# Patient Record
Sex: MALE | Race: WHITE | Employment: UNEMPLOYED | URBAN - METROPOLITAN AREA
[De-identification: names, ages, dates, MRNs, and addresses within clinical notes are randomized per-mention and may not be internally consistent; named-entity substitution may affect disease eponyms.]

---

## 2021-09-22 ENCOUNTER — OFFICE VISIT (OUTPATIENT)
Dept: FAMILY MEDICINE CLINIC | Facility: CLINIC | Age: 51
End: 2021-09-22
Payer: MEDICARE

## 2021-09-22 VITALS
SYSTOLIC BLOOD PRESSURE: 120 MMHG | TEMPERATURE: 98 F | HEIGHT: 71 IN | RESPIRATION RATE: 20 BRPM | DIASTOLIC BLOOD PRESSURE: 82 MMHG | WEIGHT: 183 LBS | OXYGEN SATURATION: 97 % | HEART RATE: 89 BPM | BODY MASS INDEX: 25.62 KG/M2

## 2021-09-22 DIAGNOSIS — Z20.822 EXPOSURE TO COVID-19 VIRUS: Primary | ICD-10-CM

## 2021-09-22 PROCEDURE — 99203 OFFICE O/P NEW LOW 30 MIN: CPT | Performed by: NURSE PRACTITIONER

## 2021-09-22 NOTE — PATIENT INSTRUCTIONS
Coronavirus Disease 2019 (COVID-19)     [facility] is committed to the safety and well-being of our patients, members, employees, and communities.  As concerns arise about the new strain of coronavirus that causes COVID-19, [facility] exposure  • After day 7 from date of last exposure with a negative test result (test must occur on day 5 or later)  After stopping quarantine, you should  • Watch for symptoms until 14 days after exposure.   • If you have symptoms, immediately self-isolate Care     If you are awaiting test results or are confirmed positive for COVID -19, and your symptoms worsen at home with symptoms such as: extreme weakness, difficult breathing, or unrelenting fevers greater than 100.4 degrees Fahrenheit, you should contac Follow-up  If you are diagnosed with COVID, refrain from exercise until approved by your primary care provider. Please call your primary care provider within 2 days of your discharge to arrange for a telehealth follow-up.  CDC does not recommend repeat test Control & Prevention (CDC)  10 things you can do to manage your health at home, Nelda.nl. pdf  [employer]ly.[employer].au Retrieved March 17, 2021, from https://health.[facility]/coronavirus/covid-19-information/covid-19-long-haulers. html  Long-term effects of covid-19. (n.d.).  Retrieved May 11, 2021, from [employer]

## 2021-09-22 NOTE — PROGRESS NOTES
CHIEF COMPLAINT:   Patient presents with:  Covid-19 Test      HPI:   Breanne Martel is a 46year old male who presents for Covid 19 testing. Pt is accompanied by his girlfriend. Pt reports COVID he and his girlfriend had exposure 5/19.    At this time, not PROC FOR SACROILIAC JOINT ARTHROGRAPHY &/OR ANESTHETIC/STEROID  10/20/2010    Performed by Vicky Fuller at [facility]   • OTHER SURGICAL HISTORY  9-21-10    flow/us/cysto-Dr. Patric nOtiveros   • OTHER SURGICAL HISTORY  10-26-10    UDS         Soci entirety of this informational document. It includes information related to exposure, pending tests, positive results, aftercare, and plasma donation.       Quarantine (for anyone in close contact with someone who has COVID-19)  Anyone who has been in [employer] others, wash your hands, avoid crowds, and take other steps to prevent the spread of COVID-19.   CDC continues to endorse quarantine for 14 days and recognizes that any quarantine shorter than 14 days balances reduced burden against a small possibility of s difficult time are changing frequently. Your healthcare provider can help direct you on next steps.     If you have not been exposed or are not aware of an exposure to COVID-19 and are concerned about your symptoms, please contact your health care provider Elva Perrin, is looking for patients who have recovered from COVID-19 and would be interested in donating plasma. Convalescent plasma is a component of blood that, in people who have recovered from COVID-19, contains antibodies against the virus.  The Nelda.nl. pdf  [employer]stagg.[employer].au  http://www.[facility].[place].gov/topics-services/diseases-and-conditions/dise https://health.[facility]/coronavirus/covid-19-information/covid-19-long-haulers. html  Long-term effects of covid-19. (n.d.).  Retrieved May 11, 2021, from [employer].  What it means to be A Coronavirus

## 2021-09-23 LAB — SARS-COV-2 RNA RESP QL NAA+PROBE: NOT DETECTED

## 2025-03-07 RX ORDER — DIAZEPAM 5 MG/1
5 TABLET ORAL EVERY 6 HOURS PRN
COMMUNITY

## 2025-03-07 RX ORDER — OMEPRAZOLE 40 MG/1
40 CAPSULE, DELAYED RELEASE ORAL DAILY
COMMUNITY

## 2025-03-07 RX ORDER — LOSARTAN POTASSIUM 25 MG/1
25 TABLET ORAL DAILY
COMMUNITY

## 2025-03-07 RX ORDER — ALFUZOSIN HYDROCHLORIDE 10 MG/1
10 TABLET, EXTENDED RELEASE ORAL DAILY
COMMUNITY

## 2025-03-07 RX ORDER — POTASSIUM CHLORIDE 1500 MG/1
20 TABLET, EXTENDED RELEASE ORAL 2 TIMES DAILY
COMMUNITY

## 2025-03-07 RX ORDER — FENOFIBRATE 134 MG/1
134 CAPSULE ORAL
COMMUNITY

## 2025-03-07 RX ORDER — FUROSEMIDE 40 MG/1
40 TABLET ORAL 2 TIMES DAILY
COMMUNITY

## 2025-03-07 RX ORDER — DOCUSATE SODIUM 100 MG/1
100 CAPSULE, LIQUID FILLED ORAL DAILY
COMMUNITY

## 2025-03-07 RX ORDER — METOPROLOL SUCCINATE 25 MG/1
25 TABLET, EXTENDED RELEASE ORAL DAILY
COMMUNITY

## 2025-03-18 ENCOUNTER — ANESTHESIA EVENT (OUTPATIENT)
Dept: SURGERY | Facility: HOSPITAL | Age: 55
End: 2025-03-18
Payer: MEDICARE

## 2025-03-19 ENCOUNTER — HOSPITAL ENCOUNTER (INPATIENT)
Facility: HOSPITAL | Age: 55
LOS: 2 days | Discharge: HOME | DRG: 330 | End: 2025-03-21
Attending: SURGERY | Admitting: SURGERY
Payer: MEDICARE

## 2025-03-19 ENCOUNTER — HOSPITAL ENCOUNTER (INPATIENT)
Facility: HOSPITAL | Age: 55
LOS: 2 days | Discharge: HOME | End: 2025-03-21
Attending: SURGERY | Admitting: SURGERY
Payer: MEDICARE

## 2025-03-19 ENCOUNTER — ANESTHESIA (OUTPATIENT)
Dept: SURGERY | Facility: HOSPITAL | Age: 55
End: 2025-03-19
Payer: MEDICARE

## 2025-03-19 LAB — GLUCOSE BLD-MCNC: 115 MG/DL (ref 70–99)

## 2025-03-19 PROCEDURE — 8E0W4CZ ROBOTIC ASSISTED PROCEDURE OF TRUNK REGION, PERCUTANEOUS ENDOSCOPIC APPROACH: ICD-10-PCS | Performed by: SURGERY

## 2025-03-19 PROCEDURE — 94760 N-INVAS EAR/PLS OXIMETRY 1: CPT

## 2025-03-19 PROCEDURE — 94640 AIRWAY INHALATION TREATMENT: CPT

## 2025-03-19 PROCEDURE — 88307 TISSUE EXAM BY PATHOLOGIST: CPT | Performed by: SURGERY

## 2025-03-19 PROCEDURE — 82962 GLUCOSE BLOOD TEST: CPT

## 2025-03-19 PROCEDURE — 0DBN4ZZ EXCISION OF SIGMOID COLON, PERCUTANEOUS ENDOSCOPIC APPROACH: ICD-10-PCS | Performed by: SURGERY

## 2025-03-19 RX ORDER — SODIUM CHLORIDE, SODIUM LACTATE, POTASSIUM CHLORIDE, CALCIUM CHLORIDE 600; 310; 30; 20 MG/100ML; MG/100ML; MG/100ML; MG/100ML
INJECTION, SOLUTION INTRAVENOUS CONTINUOUS
Status: DISCONTINUED | OUTPATIENT
Start: 2025-03-19 | End: 2025-03-19 | Stop reason: HOSPADM

## 2025-03-19 RX ORDER — BUPIVACAINE HYDROCHLORIDE 5 MG/ML
INJECTION, SOLUTION EPIDURAL; INTRACAUDAL; PERINEURAL AS NEEDED
Status: DISCONTINUED | OUTPATIENT
Start: 2025-03-19 | End: 2025-03-19 | Stop reason: HOSPADM

## 2025-03-19 RX ORDER — LIDOCAINE HYDROCHLORIDE 10 MG/ML
INJECTION, SOLUTION EPIDURAL; INFILTRATION; INTRACAUDAL; PERINEURAL AS NEEDED
Status: DISCONTINUED | OUTPATIENT
Start: 2025-03-19 | End: 2025-03-19 | Stop reason: SURG

## 2025-03-19 RX ORDER — SENNOSIDES 8.6 MG
17.2 TABLET ORAL NIGHTLY PRN
Status: DISCONTINUED | OUTPATIENT
Start: 2025-03-19 | End: 2025-03-21

## 2025-03-19 RX ORDER — DEXAMETHASONE SODIUM PHOSPHATE 4 MG/ML
VIAL (ML) INJECTION AS NEEDED
Status: DISCONTINUED | OUTPATIENT
Start: 2025-03-19 | End: 2025-03-19 | Stop reason: SURG

## 2025-03-19 RX ORDER — PANTOPRAZOLE SODIUM 40 MG/1
40 TABLET, DELAYED RELEASE ORAL
Status: DISCONTINUED | OUTPATIENT
Start: 2025-03-20 | End: 2025-03-21

## 2025-03-19 RX ORDER — PHENYLEPHRINE HCL 10 MG/ML
VIAL (ML) INJECTION AS NEEDED
Status: DISCONTINUED | OUTPATIENT
Start: 2025-03-19 | End: 2025-03-19 | Stop reason: SURG

## 2025-03-19 RX ORDER — ENOXAPARIN SODIUM 100 MG/ML
40 INJECTION SUBCUTANEOUS DAILY
Status: DISCONTINUED | OUTPATIENT
Start: 2025-03-20 | End: 2025-03-21

## 2025-03-19 RX ORDER — DIAZEPAM 5 MG/1
5 TABLET ORAL EVERY 6 HOURS PRN
Status: DISCONTINUED | OUTPATIENT
Start: 2025-03-19 | End: 2025-03-21

## 2025-03-19 RX ORDER — FAMOTIDINE 20 MG/1
20 TABLET, FILM COATED ORAL 2 TIMES DAILY
Status: DISCONTINUED | OUTPATIENT
Start: 2025-03-19 | End: 2025-03-21

## 2025-03-19 RX ORDER — SODIUM CHLORIDE 9 MG/ML
INJECTION, SOLUTION INTRAVENOUS CONTINUOUS
Status: DISCONTINUED | OUTPATIENT
Start: 2025-03-19 | End: 2025-03-20

## 2025-03-19 RX ORDER — OXYCODONE HYDROCHLORIDE 5 MG/1
5 TABLET ORAL EVERY 4 HOURS PRN
Status: DISCONTINUED | OUTPATIENT
Start: 2025-03-19 | End: 2025-03-21

## 2025-03-19 RX ORDER — KETOROLAC TROMETHAMINE 30 MG/ML
INJECTION, SOLUTION INTRAMUSCULAR; INTRAVENOUS AS NEEDED
Status: DISCONTINUED | OUTPATIENT
Start: 2025-03-19 | End: 2025-03-19 | Stop reason: SURG

## 2025-03-19 RX ORDER — ONDANSETRON 2 MG/ML
4 INJECTION INTRAMUSCULAR; INTRAVENOUS EVERY 6 HOURS PRN
Status: DISCONTINUED | OUTPATIENT
Start: 2025-03-19 | End: 2025-03-21

## 2025-03-19 RX ORDER — FUROSEMIDE 40 MG/1
40 TABLET ORAL 2 TIMES DAILY
Status: DISCONTINUED | OUTPATIENT
Start: 2025-03-20 | End: 2025-03-21

## 2025-03-19 RX ORDER — SODIUM CHLORIDE, SODIUM LACTATE, POTASSIUM CHLORIDE, CALCIUM CHLORIDE 600; 310; 30; 20 MG/100ML; MG/100ML; MG/100ML; MG/100ML
1 INJECTION, SOLUTION INTRAVENOUS CONTINUOUS
Status: DISCONTINUED | OUTPATIENT
Start: 2025-03-19 | End: 2025-03-20

## 2025-03-19 RX ORDER — METOPROLOL SUCCINATE 25 MG/1
25 TABLET, EXTENDED RELEASE ORAL DAILY
Status: DISCONTINUED | OUTPATIENT
Start: 2025-03-20 | End: 2025-03-21

## 2025-03-19 RX ORDER — HYDROMORPHONE HYDROCHLORIDE 1 MG/ML
0.6 INJECTION, SOLUTION INTRAMUSCULAR; INTRAVENOUS; SUBCUTANEOUS EVERY 5 MIN PRN
Status: DISCONTINUED | OUTPATIENT
Start: 2025-03-19 | End: 2025-03-19 | Stop reason: HOSPADM

## 2025-03-19 RX ORDER — ACETAMINOPHEN 500 MG
1000 TABLET ORAL ONCE
Status: DISCONTINUED | OUTPATIENT
Start: 2025-03-19 | End: 2025-03-19 | Stop reason: HOSPADM

## 2025-03-19 RX ORDER — ALBUTEROL SULFATE 90 UG/1
2 INHALANT RESPIRATORY (INHALATION) EVERY 6 HOURS PRN
Status: DISCONTINUED | OUTPATIENT
Start: 2025-03-19 | End: 2025-03-21

## 2025-03-19 RX ORDER — ROCURONIUM BROMIDE 10 MG/ML
INJECTION, SOLUTION INTRAVENOUS AS NEEDED
Status: DISCONTINUED | OUTPATIENT
Start: 2025-03-19 | End: 2025-03-19 | Stop reason: SURG

## 2025-03-19 RX ORDER — OXYCODONE HYDROCHLORIDE 10 MG/1
10 TABLET ORAL EVERY 4 HOURS PRN
Status: DISCONTINUED | OUTPATIENT
Start: 2025-03-19 | End: 2025-03-21

## 2025-03-19 RX ORDER — HYDROMORPHONE HYDROCHLORIDE 1 MG/ML
0.4 INJECTION, SOLUTION INTRAMUSCULAR; INTRAVENOUS; SUBCUTANEOUS EVERY 5 MIN PRN
Status: DISCONTINUED | OUTPATIENT
Start: 2025-03-19 | End: 2025-03-19 | Stop reason: HOSPADM

## 2025-03-19 RX ORDER — METRONIDAZOLE 500 MG/100ML
500 INJECTION, SOLUTION INTRAVENOUS ONCE
Status: COMPLETED | OUTPATIENT
Start: 2025-03-19 | End: 2025-03-19

## 2025-03-19 RX ORDER — NALOXONE HYDROCHLORIDE 0.4 MG/ML
0.08 INJECTION, SOLUTION INTRAMUSCULAR; INTRAVENOUS; SUBCUTANEOUS AS NEEDED
Status: DISCONTINUED | OUTPATIENT
Start: 2025-03-19 | End: 2025-03-19 | Stop reason: HOSPADM

## 2025-03-19 RX ORDER — FAMOTIDINE 10 MG/ML
20 INJECTION, SOLUTION INTRAVENOUS 2 TIMES DAILY
Status: DISCONTINUED | OUTPATIENT
Start: 2025-03-19 | End: 2025-03-21

## 2025-03-19 RX ORDER — MAGNESIUM OXIDE 400 MG/1
400 TABLET ORAL DAILY
Status: DISCONTINUED | OUTPATIENT
Start: 2025-03-19 | End: 2025-03-21

## 2025-03-19 RX ORDER — SODIUM CHLORIDE, SODIUM LACTATE, POTASSIUM CHLORIDE, CALCIUM CHLORIDE 600; 310; 30; 20 MG/100ML; MG/100ML; MG/100ML; MG/100ML
INJECTION, SOLUTION INTRAVENOUS CONTINUOUS PRN
Status: DISCONTINUED | OUTPATIENT
Start: 2025-03-19 | End: 2025-03-19 | Stop reason: SURG

## 2025-03-19 RX ORDER — ACETAMINOPHEN 500 MG
1000 TABLET ORAL ONCE AS NEEDED
Status: DISCONTINUED | OUTPATIENT
Start: 2025-03-19 | End: 2025-03-19 | Stop reason: HOSPADM

## 2025-03-19 RX ORDER — DIPHENHYDRAMINE HYDROCHLORIDE 50 MG/ML
INJECTION, SOLUTION INTRAMUSCULAR; INTRAVENOUS AS NEEDED
Status: DISCONTINUED | OUTPATIENT
Start: 2025-03-19 | End: 2025-03-19 | Stop reason: SURG

## 2025-03-19 RX ORDER — ONDANSETRON 2 MG/ML
INJECTION INTRAMUSCULAR; INTRAVENOUS AS NEEDED
Status: DISCONTINUED | OUTPATIENT
Start: 2025-03-19 | End: 2025-03-19 | Stop reason: SURG

## 2025-03-19 RX ORDER — FLUTICASONE PROPIONATE AND SALMETEROL 250; 50 UG/1; UG/1
1 POWDER RESPIRATORY (INHALATION) 2 TIMES DAILY
Status: DISCONTINUED | OUTPATIENT
Start: 2025-03-19 | End: 2025-03-21

## 2025-03-19 RX ORDER — TAMSULOSIN HYDROCHLORIDE 0.4 MG/1
0.4 CAPSULE ORAL
Status: DISCONTINUED | OUTPATIENT
Start: 2025-03-20 | End: 2025-03-21

## 2025-03-19 RX ORDER — ONDANSETRON 2 MG/ML
4 INJECTION INTRAMUSCULAR; INTRAVENOUS EVERY 6 HOURS PRN
Status: DISCONTINUED | OUTPATIENT
Start: 2025-03-19 | End: 2025-03-19 | Stop reason: HOSPADM

## 2025-03-19 RX ORDER — LIDOCAINE HYDROCHLORIDE 10 MG/ML
INJECTION, SOLUTION INFILTRATION; PERINEURAL AS NEEDED
Status: DISCONTINUED | OUTPATIENT
Start: 2025-03-19 | End: 2025-03-19 | Stop reason: HOSPADM

## 2025-03-19 RX ORDER — INDOCYANINE GREEN AND WATER 25 MG
KIT INJECTION AS NEEDED
Status: DISCONTINUED | OUTPATIENT
Start: 2025-03-19 | End: 2025-03-19 | Stop reason: SURG

## 2025-03-19 RX ORDER — HYDROCODONE BITARTRATE AND ACETAMINOPHEN 5; 325 MG/1; MG/1
2 TABLET ORAL ONCE AS NEEDED
Status: DISCONTINUED | OUTPATIENT
Start: 2025-03-19 | End: 2025-03-19 | Stop reason: HOSPADM

## 2025-03-19 RX ORDER — PROCHLORPERAZINE EDISYLATE 5 MG/ML
5 INJECTION INTRAMUSCULAR; INTRAVENOUS EVERY 8 HOURS PRN
Status: DISCONTINUED | OUTPATIENT
Start: 2025-03-19 | End: 2025-03-19 | Stop reason: HOSPADM

## 2025-03-19 RX ORDER — ESMOLOL HYDROCHLORIDE 10 MG/ML
INJECTION INTRAVENOUS AS NEEDED
Status: DISCONTINUED | OUTPATIENT
Start: 2025-03-19 | End: 2025-03-19 | Stop reason: SURG

## 2025-03-19 RX ORDER — HEPARIN SODIUM 5000 [USP'U]/ML
5000 INJECTION, SOLUTION INTRAVENOUS; SUBCUTANEOUS ONCE
Status: COMPLETED | OUTPATIENT
Start: 2025-03-19 | End: 2025-03-19

## 2025-03-19 RX ORDER — KETOROLAC TROMETHAMINE 30 MG/ML
30 INJECTION, SOLUTION INTRAMUSCULAR; INTRAVENOUS EVERY 6 HOURS
Status: DISCONTINUED | OUTPATIENT
Start: 2025-03-19 | End: 2025-03-21

## 2025-03-19 RX ORDER — LABETALOL HYDROCHLORIDE 5 MG/ML
5 INJECTION, SOLUTION INTRAVENOUS EVERY 5 MIN PRN
Status: DISCONTINUED | OUTPATIENT
Start: 2025-03-19 | End: 2025-03-19 | Stop reason: HOSPADM

## 2025-03-19 RX ORDER — HYDROMORPHONE HYDROCHLORIDE 1 MG/ML
0.2 INJECTION, SOLUTION INTRAMUSCULAR; INTRAVENOUS; SUBCUTANEOUS EVERY 5 MIN PRN
Status: DISCONTINUED | OUTPATIENT
Start: 2025-03-19 | End: 2025-03-19 | Stop reason: HOSPADM

## 2025-03-19 RX ORDER — FENOFIBRATE 134 MG/1
134 CAPSULE ORAL
Status: DISCONTINUED | OUTPATIENT
Start: 2025-03-20 | End: 2025-03-21

## 2025-03-19 RX ORDER — HYDROMORPHONE HYDROCHLORIDE 1 MG/ML
INJECTION, SOLUTION INTRAMUSCULAR; INTRAVENOUS; SUBCUTANEOUS
Status: COMPLETED
Start: 2025-03-19 | End: 2025-03-19

## 2025-03-19 RX ORDER — HYDROCODONE BITARTRATE AND ACETAMINOPHEN 5; 325 MG/1; MG/1
1 TABLET ORAL ONCE AS NEEDED
Status: DISCONTINUED | OUTPATIENT
Start: 2025-03-19 | End: 2025-03-19 | Stop reason: HOSPADM

## 2025-03-19 RX ORDER — PROCHLORPERAZINE EDISYLATE 5 MG/ML
5 INJECTION INTRAMUSCULAR; INTRAVENOUS EVERY 8 HOURS PRN
Status: DISCONTINUED | OUTPATIENT
Start: 2025-03-19 | End: 2025-03-21

## 2025-03-19 RX ORDER — LOSARTAN POTASSIUM 25 MG/1
25 TABLET ORAL DAILY
Status: DISCONTINUED | OUTPATIENT
Start: 2025-03-20 | End: 2025-03-21

## 2025-03-19 RX ORDER — MEPERIDINE HYDROCHLORIDE 25 MG/ML
12.5 INJECTION INTRAMUSCULAR; INTRAVENOUS; SUBCUTANEOUS AS NEEDED
Status: DISCONTINUED | OUTPATIENT
Start: 2025-03-19 | End: 2025-03-19 | Stop reason: HOSPADM

## 2025-03-19 RX ORDER — HYDROMORPHONE HYDROCHLORIDE 1 MG/ML
0.4 INJECTION, SOLUTION INTRAMUSCULAR; INTRAVENOUS; SUBCUTANEOUS EVERY 2 HOUR PRN
Status: DISCONTINUED | OUTPATIENT
Start: 2025-03-19 | End: 2025-03-21

## 2025-03-19 RX ORDER — MAGNESIUM SULFATE HEPTAHYDRATE 500 MG/ML
INJECTION, SOLUTION INTRAMUSCULAR; INTRAVENOUS AS NEEDED
Status: DISCONTINUED | OUTPATIENT
Start: 2025-03-19 | End: 2025-03-19 | Stop reason: SURG

## 2025-03-19 RX ORDER — POLYETHYLENE GLYCOL 3350 17 G/17G
17 POWDER, FOR SOLUTION ORAL DAILY PRN
Status: DISCONTINUED | OUTPATIENT
Start: 2025-03-19 | End: 2025-03-21

## 2025-03-19 RX ORDER — HYDROMORPHONE HYDROCHLORIDE 1 MG/ML
0.8 INJECTION, SOLUTION INTRAMUSCULAR; INTRAVENOUS; SUBCUTANEOUS EVERY 2 HOUR PRN
Status: DISCONTINUED | OUTPATIENT
Start: 2025-03-19 | End: 2025-03-21

## 2025-03-19 RX ADMIN — SODIUM CHLORIDE: 9 INJECTION, SOLUTION INTRAVENOUS at 10:04:00

## 2025-03-19 RX ADMIN — MAGNESIUM SULFATE HEPTAHYDRATE 2 G: 500 INJECTION, SOLUTION INTRAMUSCULAR; INTRAVENOUS at 08:08:00

## 2025-03-19 RX ADMIN — ROCURONIUM BROMIDE 100 MG: 10 INJECTION, SOLUTION INTRAVENOUS at 07:38:00

## 2025-03-19 RX ADMIN — SODIUM CHLORIDE: 9 INJECTION, SOLUTION INTRAVENOUS at 08:48:00

## 2025-03-19 RX ADMIN — SODIUM CHLORIDE, SODIUM LACTATE, POTASSIUM CHLORIDE, CALCIUM CHLORIDE: 600; 310; 30; 20 INJECTION, SOLUTION INTRAVENOUS at 10:04:00

## 2025-03-19 RX ADMIN — ONDANSETRON 4 MG: 2 INJECTION INTRAMUSCULAR; INTRAVENOUS at 09:41:00

## 2025-03-19 RX ADMIN — DEXAMETHASONE SODIUM PHOSPHATE 4 MG: 4 MG/ML VIAL (ML) INJECTION at 08:06:00

## 2025-03-19 RX ADMIN — ESMOLOL HYDROCHLORIDE 70 MG: 10 INJECTION INTRAVENOUS at 07:36:00

## 2025-03-19 RX ADMIN — SODIUM CHLORIDE, SODIUM LACTATE, POTASSIUM CHLORIDE, CALCIUM CHLORIDE: 600; 310; 30; 20 INJECTION, SOLUTION INTRAVENOUS at 07:46:00

## 2025-03-19 RX ADMIN — METRONIDAZOLE 500 MG: 500 INJECTION, SOLUTION INTRAVENOUS at 07:50:00

## 2025-03-19 RX ADMIN — DIPHENHYDRAMINE HYDROCHLORIDE 12.5 MG: 50 INJECTION, SOLUTION INTRAMUSCULAR; INTRAVENOUS at 08:07:00

## 2025-03-19 RX ADMIN — PHENYLEPHRINE HCL 100 MCG: 10 MG/ML VIAL (ML) INJECTION at 07:48:00

## 2025-03-19 RX ADMIN — LIDOCAINE HYDROCHLORIDE 25 MG: 10 INJECTION, SOLUTION EPIDURAL; INFILTRATION; INTRACAUDAL; PERINEURAL at 07:38:00

## 2025-03-19 RX ADMIN — SODIUM CHLORIDE: 9 INJECTION, SOLUTION INTRAVENOUS at 07:32:00

## 2025-03-19 RX ADMIN — INDOCYANINE GREEN AND WATER 10 MG: 25 MG KIT INJECTION at 09:02:00

## 2025-03-19 RX ADMIN — KETOROLAC TROMETHAMINE 30 MG: 30 INJECTION, SOLUTION INTRAMUSCULAR; INTRAVENOUS at 09:41:00

## 2025-03-19 NOTE — ANESTHESIA PROCEDURE NOTES
Airway  Date/Time: 3/19/2025 7:42 AM  Urgency: elective      General Information and Staff    Patient location during procedure: OR  Anesthesiologist: Carlitos Whipple MD  Performed: anesthesiologist   Performed by: Carlitos Whipple MD  Authorized by: Carlitos Whipple MD      Indications and Patient Condition  Indications for airway management: anesthesia  Spontaneous Ventilation: absent  Sedation level: deep  Preoxygenated: yes  Patient position: sniffing  Mask difficulty assessment: 0 - not attempted    Final Airway Details  Final airway type: endotracheal airway      Successful airway: ETT  Cuffed: yes   Successful intubation technique: direct laryngoscopy  Facilitating devices/methods: intubating stylet  Endotracheal tube insertion site: oral  Blade: Sheryl  Blade size: #4  ETT size (mm): 8.0    Cormack-Lehane Classification: grade IIA - partial view of glottis  Placement verified by: capnometry   Measured from: gums  ETT to gums (cm): 23  Number of attempts at approach: 1    Additional Comments  Dentition unchanged after DL and intubation, atraumatic procedure.

## 2025-03-19 NOTE — PLAN OF CARE
NURSING ADMISSION NOTE      Patient admitted via Cart  Oriented to room.  Safety precautions initiated.  Bed in low position.  Call light in reach.    1240: Pt arrived to floor in stable condition. 2 person skin check performed with Charito PCT, no skin breakdown noted.

## 2025-03-19 NOTE — H&P
H&P Notes  - documented in this encounter   Simon Horne MD - 02/06/2025 9:45 AM CST  Formatting of this note is different from the original.  Dorian Gonzalez is a 54 year old male. Patient presents with:  New Patient: Diverticulitis      Aung Cox    HPI:  Patient presents with recalcitrant acute diverticulitis of the sigmoid colon. Send several episodes in the past. He has been struggling for the last 2 months on and off with abdominal pain despite both outpatient and inpatient treatment of acute diverticulitis of the sigmoid colon. His last workup was in early January with a CT scan. At that time he was found to have acute diverticulitis of the sigmoid colon. He completed his antibiotic course and currently has minimal pain. Past medical history significant for a complicated spine surgery through an anterior approach with an iliac artery injury with subsequent disability, leg weakness and numbness. He is on crutches. He is currently on home oxygen. He denies fevers or chills. He is tolerating a diet.    Past Medical History:  Diagnosis Date  Adrenal nodule (HCC) 07/26/2024  adrenal adenoma noted in CT ABDOMEN+PELVIS on 12/18/2024.  BACK PAIN  Cancer (HCC) 2021  Congestive heart disease (HCC)  COPD (chronic obstructive pulmonary disease) ([facility])  NO O2  COVID 2022  Deep vein thrombosis (HCC) 2022  Exposure to medical diagnostic radiation  Foot drop, left foot  High blood pressure  History of prostate cancer  Insomnia  Neuropathy  Osteoarthritis  Personal history of antineoplastic chemotherapy  Pulmonary embolism (HCC) 2022    Past Surgical History:  Procedure Laterality Date  APPENDECTOMY  age 9  BACK SURGERY 2009  L4-L5 S1 fused and caged  CARDIAC CATH - CARDIO (EXTERNAL) 03/11/2024  No significant coronary artery disease.Mild calcific right and left coronary system.Normal pulmonary artery pressure.Nomal Left Ventricular Funstion. Medical Management. Dr MEHNAZ Nolan [facility]  CATH PERCUTANEOUS TRANSLUMINAL  CORONARY ANGIOPLASTY  COLSC FLX W/RMVL OF TUMOR POLYP LESION SNARE TQ 03/31/2010  Performed by VANNESA ALBERT at [facility]  FLUOR NEEDLE/CATH SPINE/PARASPINAL DX/THER ADDON 09/15/2010  Performed by MALENA MAZA at [facility]  FLUOR NEEDLE/CATH SPINE/PARASPINAL DX/THER ADDON 10/20/2010  Performed by MALENA MAZA at [facility]  INJECT SI JOINT ARTHRGRPHY&/ANES/STEROID W/SABINA 09/15/2010  Performed by MALENA MAZA at [facility]  INJECT SI JOINT ARTHRGRPHY&/ANES/STEROID W/SABINA 09/15/2010  Performed by MALENA MAZA at [facility]  INJECT SI JOINT ARTHRGRPHY&/ANES/STEROID W/SABINA 10/20/2010  Performed by MALENA MAZA at [facility]  INJECT SI JOINT ARTHRGRPHY&/ANES/STEROID W/SABINA 10/20/2010  Performed by MALENA MAZA at [facility]  OTHER SURGICAL HISTORY 09/21/2010  flow/us/cysto-Dr. Amin  OTHER SURGICAL HISTORY 10/26/2010  UDS  OTHER SURGICAL HISTORY 12/19/2022  left leg SI joint infusion 3 titanium plates  UNLISTED MUSCULOSKELETAL PROCEDURE HEAD 04/10/2009  Performed by AMY MANZO at [facility]    Family History  Problem Relation Age of Onset  Heart Disorder Father  Cancer Father  prostate  Diabetes Father  Hypertension Father  Other (Other) Father  CVA  Diabetes Mother  Cancer Maternal Grandfather  Diabetes Paternal Grandmother  Other (lupus anticoagulant) Sister  Hypertension Sister  Diabetes Sister  Cancer Sister  cervical  Other (lupus anticoagulant) Sister  Diabetes Brother  Cancer Brother  prostate  Cancer Paternal Aunt  bone  Other (anticardiolipin) Paternal Cousin Female  Other (lupus anticoagulant) Paternal Cousin Female  Other (anticardiolipin) Paternal Cousin Female  Other (lupus anticoagulant) Paternal Cousin Female  Other (anticardiolipin) Paternal Cousin Female  Other (lupus anticoagulant) Paternal Cousin Female    Social History  Tobacco Use  Smoking status: Every  Day  Packs/day: 0.50  Years: 0.5 packs/day for 46.9 years (23.5 ttl pk-yrs)  Types: Cigarettes  Start date: 11/1/1977  Last attempt to quit: 11/1/2022  Passive exposure: Current  Smokeless tobacco: Current  Vaping Use  Vaping status: Never Used  Alcohol use: Not Currently  Comment: twice a year  Drug use: Yes  Types: Cannabis  Comment: edibles, once daily, lasst use 5/15/24    Allergies:    Aspirin HIVES, SWELLING  Comment:Other reaction(s): Facial Swelling Other  reaction(s): facial swelling Other reaction(s):  Facial Swelling Other reaction(s): facial swelling  Other reaction(s): facial swelling  Pregabalin SWELLING  Current Meds:  Current Outpatient Medications  Medication Sig Dispense Refill  predniSONE 10 MG Oral Tab Take 1 capsule by mouth daily.  azithromycin 250 MG Oral Tab Take 1 capsule by mouth daily.  alfuzosin ER 10 MG Oral Tablet 24 Hr Take 1 capsule by mouth daily.  lidocaine 5 % External Ointment Apply 5 g topically 4 (four) times daily.  metoprolol succinate ER 25 MG Oral Tablet 24 Hr TAKE ONE TABLET BY MOUTH ONE TIME DAILY 90 tablet 1  ondansetron 4 MG Oral Tablet Dispersible Take 4 mg by mouth every 6 (six) hours as needed.  losartan 25 MG Oral Tab Take 1 tablet (25 mg total) by mouth daily. 30 tablet 2  metRONIDAZOLE 500 MG Oral Tab Take 1 tablet (500 mg total) by mouth 3 (three) times daily. 21 tablet 0  meclizine 12.5 MG Oral Tab Take 1 tablet (12.5 mg total) by mouth 3 (three) times daily as needed for Nausea. 30 tablet 0  fenofibrate micronized 134 MG Oral Cap TAKE ONE CAPSULE BY MOUTH ONE TIME DAILY with breakfast 90 capsule 0  Omeprazole 40 MG Oral Capsule Delayed Release Take 1 capsule (40 mg total) by mouth before breakfast. 90 capsule 3  furosemide (LASIX) 20 MG Oral Tab Take 1 tablet (20 mg total) by mouth daily. 90 tablet 3  albuterol 108 (90 Base) MCG/ACT Inhalation Aero Soln Inhale 2 puffs into the lungs every 6 (six) hours as needed for Wheezing. 1 each  3  Fluticasone-Umeclidin-Vilant (TRELEGY ELLIPTA) 100-62.5-25 MCG/ACT Inhalation Aerosol Powder, Breath Activated Inhale 1 puff into the lungs daily. 3 each 3  albuterol 108 (90 Base) MCG/ACT Inhalation Aero Soln Inhale 2 puffs into the lungs every 6 (six) hours as needed for Wheezing. 1 each 1  alfuzosin ER 10 MG Oral Tablet 24 Hr Take 10 mg by mouth daily.  diazePAM 5 MG Oral Tab  HYDROcodone-acetaminophen  MG Oral Tab Take 1 tablet by mouth every 6 (six) hours as needed for Pain.    ROS:  A comprehensive 14 point review of systems was completed. Pertinent positives and negatives noted in the the HPI.    EXAM:    GENERAL: well developed, well nourished, in no apparent distress  SKIN: no rashes, no suspicious lesions  EYES: PERRLA, EOMI, sclera anicteric, conjunctiva normal; fundi normal, there is no nystagmus  HEENT: normocephalic; normal nose, pharynx and TM's  NECK: supple; FROM; no JVD, no TMG, no carotid bruits  BREASTS: deferred  RESPIRATORY: clear to percussion and auscultation  CARDIOVASCULAR: S1, S2 normal, RRR; no S3, no S4; no click; murmur negative  ABDOMEN: normal active BS+, soft, nondistended; no HSM; no masses; no bruits; nontender  RECTAL: deferred  LYMPHATIC: no lymphadenopathy  MUSCULOSKELETAL: no acute synovitis upper or lower extremity, no spinal tenderness  EXTREMITIES: no cyanosis, clubbing or edema, peripheral pulses intact  NEUROLOGIC: intact; no sensorimotor deficit; reflexes normal  PSYCHIATRIC: alert and oriented x 3; affect appropriate    IMPRESSION  Recurrent acute diverticulitis of the sigmoid colon.    PLAN:  We discussed the role of surgery in this condition. I have recommended a follow-up CT scan now that his treatment has been completed but his symptoms have not completely resolved. He may need additional antibiotics. We discussed the role of the robotic assisted low anterior colon resection. The rationale risk benefits and alternatives of this approach was explained in  detail. All of his questions were answered. Will await CT scan findings prior to scheduling. They voiced understanding    Thank you very much for your kind referrals    Simon Horne MD    Electronically signed by Simon Horne MD at 02/06/2025 11:03 AM CST    The above referenced H&P was reviewed by Simon Horne MD on 3/19/2025, the patient was examined and no significant changes have occurred in the patient's condition since the H&P was performed.  Risks and benefits were discussed, proceed with procedure as planned.

## 2025-03-19 NOTE — ANESTHESIA PROCEDURE NOTES
Peripheral IV  Date/Time: 3/19/2025 7:46 AM  Inserted by: Carlitos Whipple MD    Placement  Needle size: 16 G  Laterality: left  Location: forearm  Site prep: alcohol  Technique: ultrasound guided  Attempts: 1

## 2025-03-19 NOTE — ANESTHESIA PREPROCEDURE EVALUATION
PRE-OP EVALUATION    Patient Name: Dorian Gonzalez    Admit Diagnosis: ACUTE DIVERTICULITIS    Pre-op Diagnosis: ACUTE DIVERTICULITIS    XI ROBOT-ASSISTED LOW ANTERIOR COLON RESECTION POSSIBLE OPEN, RIGID PROCTOSIGMOIDOSCOPY    Anesthesia Procedure: XI ROBOT-ASSISTED LOW ANTERIOR COLON RESECTION POSSIBLE OPEN, RIGID PROCTOSIGMOIDOSCOPY    Surgeons and Role:     * Simon Horne MD - Primary    Pre-op vitals reviewed.  Temp: 97.8 °F (36.6 °C)  Pulse: 70  Resp: 16  BP: 106/70  SpO2: 98 %  Body mass index is 30.4 kg/m².    Current medications reviewed.  Hospital Medications:   acetaminophen (Tylenol Extra Strength) tab 1,000 mg  1,000 mg Oral Once    sodium chloride 0.9% infusion   Intravenous Continuous    [COMPLETED] heparin (Porcine) 5000 UNIT/ML injection 5,000 Units  5,000 Units Subcutaneous Once    cefTRIAXone (Rocephin) 2 g in sodium chloride 0.9% 100 mL IVPB-ADDV  2 g Intravenous Once    metroNIDAZOLE in sodium chloride 0.79% (Flagyl) 5 mg/mL IVPB premix 500 mg  500 mg Intravenous Once       Outpatient Medications:   Prescriptions Prior to Admission[1]    Allergies: Aspirin and Lyrica [pregabalin]      Anesthesia Evaluation  Patient Active Problem List   Diagnosis    Other and unspecified disc disorder of unspecified region    Degeneration of lumbar or lumbosacral intervertebral disc    Lumbar radicular pain        Past Medical History:    BACK PAIN    Back problem    Cancer (HCC)    PROSTATE    Congestive heart disease (HCC)    COPD (chronic obstructive pulmonary disease) (HCC)    on oxygen    Deep vein thrombosis (HCC)    Diverticulosis of large intestine    Exposure to medical diagnostic radiation    LAST DOSE OVER 4 YEARS AGO    High blood pressure    High cholesterol    History of prostate cancer    Neuropathy    LEFT INVERTED FOOT DROP,  AND TO BOTH LEGS OUTER    Osteoarthritis    Pulmonary embolism (HCC)    Pulmonary emphysema (HCC)        Past Surgical History:   Procedure Laterality Date     Appendectomy      age 9    Colonoscopy,remv lesn,snare  03/31/2010    Performed by VANNESA ALBERT at [facility]    Fluor gid & loclzj ndl/cath spi dx/ther njx  09/15/2010    Performed by MALENA MAZA at [facility]    Fluor gid & loclzj ndl/cath spi dx/ther njx  10/20/2010    Performed by MALENA MAZA at [facility]    Head surgery proc unlisted  04/10/2009    Performed by AMY MANZO at [facility]    Injection proc for sacroiliac joint arthrography &/or anesthetic/steroid  09/15/2010    Performed by MALENA MAZA at [facility]    Injection proc for sacroiliac joint arthrography &/or anesthetic/steroid  09/15/2010    Performed by MALENA MAZA at [facility]    Injection proc for sacroiliac joint arthrography &/or anesthetic/steroid  10/20/2010    Performed by MALENA MAZA at [facility]    Injection proc for sacroiliac joint arthrography &/or anesthetic/steroid  10/20/2010    Performed by MALENA MAZA at [facility]    Other surgical history  09/21/2010    flow/us/cysto-Dr. Amin    Other surgical history  10/26/2010    UDS    Spine surgery procedure unlisted       Social History     Socioeconomic History    Marital status:    Tobacco Use    Smoking status: Every Day     Types: Cigarettes    Smokeless tobacco: Never   Vaping Use    Vaping status: Never Used   Substance and Sexual Activity    Alcohol use: Yes     Comment: RARELY   2X YEAR    Drug use: Yes     Types: Cannabis     Comment: MEDICAL CARD     History   Drug Use    Types: Cannabis     Comment: MEDICAL CARD     Available pre-op labs reviewed.               Airway      Mallampati: II  Mouth opening: >3 FB  TM distance: 4 - 6 cm  Neck ROM: full Cardiovascular      Rhythm: regular  Rate: normal     Dental    Dentition appears grossly intact         Pulmonary    Pulmonary exam normal.  Breath sounds clear to  auscultation bilaterally.               Other findings              ASA: 3   Plan: general  NPO status verified and     Post-procedure pain management plan discussed with surgeon and patient.    Comment:  I explained intrinsic risks of general anesthesia, including nausea, dental damage, sore throat, mouth injury,and hoarseness from airway management.  All questions were answered and understanding was demonstrated of risks.  Informed permission was obtained to proceed as documented in the signed consent form.      Plan/risks discussed with: patient  Use of blood product(s) discussed with: patient    Consented to blood products.          Present on Admission:  **None**             [1]   Medications Prior to Admission   Medication Sig Dispense Refill Last Dose/Taking    furosemide 40 MG Oral Tab Take 1 tablet (40 mg total) by mouth 2 (two) times daily.   3/19/2025 Morning    potassium chloride 20 MEQ Oral Tab CR Take 1 tablet (20 mEq total) by mouth 2 (two) times daily.   3/19/2025 Morning    alfuzosin ER 10 MG Oral Tablet 24 Hr Take 1 tablet (10 mg total) by mouth daily.   3/19/2025 Morning    docusate sodium 100 MG Oral Cap Take 1 capsule (100 mg total) by mouth daily.   3/18/2025    fenofibrate micronized 134 MG Oral Cap Take 1 capsule (134 mg total) by mouth daily with breakfast.   3/19/2025 Morning    losartan 25 MG Oral Tab Take by mouth daily.   3/18/2025    metoprolol succinate ER 25 MG Oral Tablet 24 Hr Take 1 tablet (25 mg total) by mouth daily.   3/19/2025 at  3:30 AM    Omeprazole 40 MG Oral Capsule Delayed Release Take 1 capsule (40 mg total) by mouth daily.   3/19/2025 Morning    diazePAM 5 MG Oral Tab Take 1 tablet (5 mg total) by mouth every 6 (six) hours as needed for Anxiety.   3/17/2025    MAGNESIUM OR Take by mouth. WITH VITAMIN D AND ZINC   3/18/2025    Fluticasone-Umeclidin-Vilant (TRELEGY ELLIPTA IN) Inhale into the lungs.   3/19/2025 Morning    ALBUTEROL SULFATE IN Inhale into the lungs.   Past  Month    NORCO  MG OR TABS 1 TABLET EVERY 4 HOURS AS NEEDED   3/17/2025

## 2025-03-19 NOTE — ANESTHESIA POSTPROCEDURE EVALUATION
[facility]    Dorian Gonzalez Patient Status:  Inpatient   Age/Gender 54 year old male MRN [id number]   Location [facility] SURGERY Attending Simon Horne MD   Hosp Day # 0 PCP Simón Carrington MD       Anesthesia Post-op Note    XI ROBOT-ASSISTED LOW ANTERIOR COLON RESECTION RIGID PROCTOSIGMOIDOSCOPY    Procedure Summary       Date: 03/19/25 Room / Location:  MAIN OR 09 / [facility] MAIN OR    Anesthesia Start: 0732 Anesthesia Stop:     Procedure: XI ROBOT-ASSISTED LOW ANTERIOR COLON RESECTION RIGID PROCTOSIGMOIDOSCOPY Diagnosis: (ACUTE DIVERTICULITIS)    Surgeons: Simon Horne MD Anesthesiologist: Carlitos Whipple MD    Anesthesia Type: general ASA Status: 3            Anesthesia Type: general    Vitals Value Taken Time   /87 03/19/25 1005   Temp 97.8 03/19/25 1005   Pulse 87 03/19/25 1005   Resp 16 03/19/25 1005   SpO2 98 03/19/25 1005           Patient Location: PACU    Anesthesia Type: general    Airway Patency: patent    Postop Pain Control: adequate    Mental Status: preanesthetic baseline    Nausea/Vomiting: none    Cardiopulmonary/Hydration status: stable euvolemic    Complications: no apparent anesthesia related complications    Postop vital signs: stable    Dental Exam: Unchanged from Preop    Patient to be discharged from PACU when criteria met.

## 2025-03-19 NOTE — OPERATIVE REPORT
[facility]                                                         Operative Note    Dorian Gonzalez Location: ASC Perioperative   CSN [id number] MRN [id number]   Admission Date 3/19/2025 Procedure Date 3/19/2025   Attending Physician Simon Horne MD Procedure Physician Simon Horne MD     Pre-Operative Diagnosis: ACUTE DIVERTICULITIS     Post-Operative Diagnosis: ACUTE DIVERTICULITIS    Procedure Performed: XI ROBOT-ASSISTED LOW ANTERIOR COLON RESECTION RIGID PROCTOSIGMOIDOSCOPY    Surgeon: Simon Horne MD     Assistant(s):  Surgical Assistant.: Nani Barker RSA    The surgical Assistant was necessary for this procedure.  The assistant was involved in patient positioning, instrument exchange, improving exposure optimizing visualization of patient's safety, and closure.  The duties of the assistant allowed for reduced risk of the performance of this procedure and care of this patient         Anesthesia: General       Complications: none       Estimated Blood Loss: Blood Output: 20 mL (3/19/2025  9:43 AM)              Operative Summary: Patient was taken to the operating room placed in a lithotomy position.  They were prepped and draped in usual fashion after being placed under general anesthesia.  An incision was made to the right of midline at the level of the umbilicus and dissection was carried down to the fascia.  Fascia was incised exposing the rectus abdominis musculature.  A muscle-splitting technique was utilized to expose the peritoneum.  Peritoneum was opened and the abdomen was entered.  An Joe retractor was placed in the abdomen was insufflated with 15 mm of carbon dioxide after the yellow cath was placed.  12 mm trocar was placed in the right lower quadrant followed by 2 additional 8 mm trochars in the upper abdomen in the line of a diagonal.  A 5 mm assistant port was placed in the right mid quadrant.  The additional trochars were placed under direct laparoscopic view.  Patient was  then placed in a steep Trendelenburg position.  Small bowel was retrieved out of the pelvis.  The excised da Keaton robot was brought into position and docked.  Instrumentation was placed.  Patient was found to have an inflammatory reaction involving the sigmoid colon consistent with their preoperative history of diverticulitis.  Proximal to the area of disease was mobilized laterally to and around the splenic flexure.  The medial dissection was then encountered by incising the mesentery to the distal colon exposing the inferior mesenteric artery vascular bundle.  Windows were created on either side of this and then this was ligated with the vessel sealer.  Windows were created followed by ligation of the vasculature to this abnormal segment of colon to the rectosigmoid junction.  This allowed exposure of the retroperitoneum keeping the ureter and pelvic vascular out of harm's way.  Once this was accomplished a lateral to medial dissection occurred mobilizing the colon away from the left lateral sidewall down to the distal region of the sigmoid colon and to the rectosigmoid junction.  Colon was divided at the junction with the 45 mm sure form.  Dissection was then carried medial to the area of disease to an area of colon which appeared to be healthy.  Immunofluorescence was utilized to appreciate the demarcation of the bowel viability in this area.  Once this was accomplished a 29 mm anvil was introduced to the abdominal cavity.  An enterotomy was made on the specimen side of the demarcated bowel as well as a small enterotomy on the proximal end.  A 29 mm anvil was then advanced through this enterotomy and exited out of the proximal colon.  Specimen enterotomy was closed with a 3 oh VueLock suture followed by dividing the colon distal to the exit site of the anvil.  Specimen was set aside for future removal.  Colon was then brought down to the pelvis.  There is no tension present.  The rectal remnant was then  serially dilated followed by advancing the EEA stapling device into the rectal remnant and exiting the spike out the staple line.  An end-to-end anastomosis was then performed in this location.  The donuts were inspected and appeared to be intact.  A rigid proctoscopy was then performed insufflating air up into the rectal remnant.  Saline was infused in the pelvis and no gross leaks or air bubbles were appreciated.  Once the rigid proctoscope was removed the anterior staple line was reinforced with interrupted 3-0 silk sutures.  Hemostasis achieved.  Specimen was retrieved from the abdominal cavity through the Joe port.  This was followed by closing the 12 mm trocar site in the right lower quadrant with an 0 Vicryl suture at the fascial level.  Trochars were removed.  Larger wound near the umbilicus was reapproximated in 2 layers by first reapproximating the peritoneum with an 0 Vicryl suture followed by closure of the fascia with an 0 PDS.  Remainder the wounds were closed with Monocryl.  Dermabond was placed on the skin.  Patient was awoken taken recovery room in satisfactory condition.  There are no complications during the case          Simon Horne MD  3/19/2025  9:47 AM

## 2025-03-20 LAB
ANION GAP SERPL CALC-SCNC: 6 MMOL/L (ref 0–18)
BASOPHILS # BLD AUTO: 0.03 X10(3) UL (ref 0–0.2)
BASOPHILS NFR BLD AUTO: 0.2 %
BUN BLD-MCNC: 12 MG/DL (ref 9–23)
CALCIUM BLD-MCNC: 8.7 MG/DL (ref 8.7–10.6)
CHLORIDE SERPL-SCNC: 109 MMOL/L (ref 98–112)
CO2 SERPL-SCNC: 27 MMOL/L (ref 21–32)
CREAT BLD-MCNC: 1.17 MG/DL
EGFRCR SERPLBLD CKD-EPI 2021: 73 ML/MIN/1.73M2 (ref 60–?)
EOSINOPHIL # BLD AUTO: 0.03 X10(3) UL (ref 0–0.7)
EOSINOPHIL NFR BLD AUTO: 0.2 %
ERYTHROCYTE [DISTWIDTH] IN BLOOD BY AUTOMATED COUNT: 13.1 %
GLUCOSE BLD-MCNC: 104 MG/DL (ref 70–99)
HCT VFR BLD AUTO: 33.7 %
HGB BLD-MCNC: 12 G/DL
IMM GRANULOCYTES # BLD AUTO: 0.05 X10(3) UL (ref 0–1)
IMM GRANULOCYTES NFR BLD: 0.4 %
LYMPHOCYTES # BLD AUTO: 2.73 X10(3) UL (ref 1–4)
LYMPHOCYTES NFR BLD AUTO: 21.8 %
MAGNESIUM SERPL-MCNC: 1.6 MG/DL (ref 1.6–2.6)
MCH RBC QN AUTO: 29.7 PG (ref 26–34)
MCHC RBC AUTO-ENTMCNC: 35.6 G/DL (ref 31–37)
MCV RBC AUTO: 83.4 FL
MONOCYTES # BLD AUTO: 0.88 X10(3) UL (ref 0.1–1)
MONOCYTES NFR BLD AUTO: 7 %
NEUTROPHILS # BLD AUTO: 8.81 X10 (3) UL (ref 1.5–7.7)
NEUTROPHILS # BLD AUTO: 8.81 X10(3) UL (ref 1.5–7.7)
NEUTROPHILS NFR BLD AUTO: 70.4 %
OSMOLALITY SERPL CALC.SUM OF ELEC: 294 MOSM/KG (ref 275–295)
PHOSPHATE SERPL-MCNC: 1.9 MG/DL (ref 2.4–5.1)
PLATELET # BLD AUTO: 304 10(3)UL (ref 150–450)
POTASSIUM SERPL-SCNC: 3.4 MMOL/L (ref 3.5–5.1)
POTASSIUM SERPL-SCNC: 4 MMOL/L (ref 3.5–5.1)
RBC # BLD AUTO: 4.04 X10(6)UL
SODIUM SERPL-SCNC: 142 MMOL/L (ref 136–145)
WBC # BLD AUTO: 12.5 X10(3) UL (ref 4–11)

## 2025-03-20 PROCEDURE — 80048 BASIC METABOLIC PNL TOTAL CA: CPT | Performed by: SURGERY

## 2025-03-20 PROCEDURE — 84100 ASSAY OF PHOSPHORUS: CPT | Performed by: SURGERY

## 2025-03-20 PROCEDURE — 84132 ASSAY OF SERUM POTASSIUM: CPT | Performed by: STUDENT IN AN ORGANIZED HEALTH CARE EDUCATION/TRAINING PROGRAM

## 2025-03-20 PROCEDURE — 94760 N-INVAS EAR/PLS OXIMETRY 1: CPT

## 2025-03-20 PROCEDURE — 83735 ASSAY OF MAGNESIUM: CPT | Performed by: SURGERY

## 2025-03-20 PROCEDURE — 85025 COMPLETE CBC W/AUTO DIFF WBC: CPT | Performed by: SURGERY

## 2025-03-20 RX ORDER — MAGNESIUM OXIDE 400 MG/1
400 TABLET ORAL ONCE
Status: COMPLETED | OUTPATIENT
Start: 2025-03-20 | End: 2025-03-20

## 2025-03-20 RX ORDER — POTASSIUM CHLORIDE 14.9 MG/ML
20 INJECTION INTRAVENOUS ONCE
Status: COMPLETED | OUTPATIENT
Start: 2025-03-20 | End: 2025-03-20

## 2025-03-20 NOTE — PROGRESS NOTES
[facility] Hospitalist Progress Note     CC: Hospital Follow up    PCP: Simón Carrington MD       Assessment/Plan:     Active Problems:    * No active hospital problems. *          54 year old male with past medical history of prostate cancer, COPD on 2 L O2 nasal cannula chronically, hypertension, hyperlipidemia, DVT/PE, HFrEF (45-50% LVEF February 2024), spinal surgery with anterior approach c/b iliac artery injury with subsequent disability/leg weakness, who is presenting to the hospital for robotic assisted low anterior colon resection rigid proctosigmoidoscopy.     Recurrent sigmoid diverticulitis s/p robotic assisted low anterior colon resection rigid proctosigmoidoscopy 3/19)  - PO/IV meds for pain control per surgery, wean to oral meds as able  - Adv diet as tolerated per surgery, zofran prn for nausea, OBR  - PT/OT/SW  - cbc reviwed, stable, appropriate blood loss  - further management per surgery     COPD  -On baseline 2 L O2 nasal cannula  -Continue home Advair, Incruse Ellipta inhalers     HFrEF (45-50% LVEF February 2024)  -Continue home metoprolol succinate, resume losartan  - Hold Lasix while patient on IVF, recommend reducing IVF given HFrEF    GERD  -Continue pantoprazole, Pepcid     Hypertension  -Continue beta-blocker, resume losartan tomorrow     History of DVT/PE  -No longer on anticoagulation  -Recommend low-dose Eliquis or daily Lovenox during postop period At discharge     Dispo: Inpatient     Outpatient or previous hospital records reviewed. Questions/concerns were discussed with patient and/or family by bedside. Discussed with surgery.     Frandy Roman DO  Hospitalist  [facility]       Subjective:     Complains of right shoulder pain.  Overall was doing better until later in the morning when he started to have abdominal cramping.  Has been ambulatory.  Denies any chest pain or shortness of breath.    OBJECTIVE:    Blood pressure 122/76, pulse 79, temperature 98.3 °F (36.8 °C),  temperature source Oral, resp. rate 17, height 5' 11\" (1.803 m), weight 218 lb (98.9 kg), SpO2 98%.    Temp:  [97.5 °F (36.4 °C)-98.3 °F (36.8 °C)] 98.3 °F (36.8 °C)  Pulse:  [74-91] 79  Resp:  [16-20] 17  BP: (115-133)/(71-85) 122/76  SpO2:  [95 %-98 %] 98 %      Intake/Output:    Intake/Output Summary (Last 24 hours) at 3/20/2025 1414  Last data filed at 3/20/2025 1316  Gross per 24 hour   Intake 2130 ml   Output 1475 ml   Net 655 ml       Last 3 Weights   03/19/25 0636 218 lb (98.9 kg)   03/07/25 1102 220 lb (99.8 kg)   09/22/21 1318 183 lb (83 kg)   02/01/11 0849 214 lb (97.1 kg)       Exam   Gen: No acute distress, alert and oriented x3, no focal neurologic deficits  Heent: NC AT, mucous memb clear, neck supple  Pulm: Lungs clear, normal respiratory effort  CV: Heart with regular rate and rhythm, no peripheral edema  Abd: Abdomen soft, tenderness around incision sites, incisions appear clean dry and intact, nondistended, hypoactive bowel sounds  MSK: Full range of motion in extremities, no clubbing, no cyanosis  Skin: no rashes or lesions  Neuro: AO*3, motor intact, no sensory deficits  Psyc: appropriate mood and affect      Data Review:       Labs:     Recent Labs   Lab 03/20/25  0511   RBC 4.04*   HGB 12.0*   HCT 33.7*   MCV 83.4   MCH 29.7   MCHC 35.6   RDW 13.1   NEPRELIM 8.81*   WBC 12.5*   .0         Recent Labs   Lab 03/20/25  0511   *   BUN 12   CREATSERUM 1.17   EGFRCR 73   CA 8.7      K 3.4*      CO2 27.0       No results for input(s): \"ALT\", \"AST\", \"ALB\", \"AMYLASE\", \"LIPASE\", \"LDH\" in the last 168 hours.    Invalid input(s): \"ALPHOS\", \"TBIL\", \"DBIL\", \"TPROT\"      Imaging:  No results found.      Meds:      potassium chloride  20 mEq Intravenous Once    famotidine  20 mg Oral BID    Or    famotidine  20 mg Intravenous BID    magnesium oxide  400 mg Oral Daily    enoxaparin  40 mg Subcutaneous Daily    ketorolac  30 mg Intravenous Q6H    tamsulosin  0.4 mg Oral Daily @ 0700     fenofibrate micronized  134 mg Oral Daily with breakfast    fluticasone-salmeterol  1 puff Inhalation BID    umeclidinium bromide  1 puff Inhalation Daily    furosemide  40 mg Oral BID    losartan  25 mg Oral Daily    metoprolol succinate ER  25 mg Oral Daily    pantoprazole  40 mg Oral QAM AC      sodium chloride 20 mL/hr at 03/19/25 0637    lactated ringers 1 mL/kg/hr (03/19/25 1331)       polyethylene glycol (PEG 3350)    sennosides    oxyCODONE **OR** oxyCODONE    HYDROmorphone **OR** HYDROmorphone    melatonin    ondansetron    prochlorperazine    albuterol    diazePAM      Supplementary Documentation:   DVT Mechanical Prophylaxis:   SCDs, Early ambuation  DVT Pharmacologic Prophylaxis   Medication    enoxaparin (Lovenox) 40 MG/0.4ML SUBQ injection 40 mg                Code Status: Full Code  Garcia: No urinary catheter in place  Garcia Duration (in days):   Central line:    BRADY: 3/21/2025

## 2025-03-20 NOTE — CONSULTS
General Medicine Consult      Reason for consult:    Consulted by: Simon Horne MD    PCP: Simón Carrington MD      History of Present Illness: Patient is a 54 year old male with past medical history of prostate cancer, COPD on 2 L O2 nasal cannula chronically, hypertension, hyperlipidemia, DVT/PE, HFrEF (45-50% LVEF February 2024), spinal surgery with anterior approach c/b iliac artery injury with subsequent disability/leg weakness, who is presenting to the hospital for robotic assisted low anterior colon resection rigid proctosigmoidoscopy.  Patient has had recurrent episodes of sigmoid diverticulitis in the past for which patient was receiving this procedure.  Tolerated surgery well on 3/19.    PMH:  Past Medical History:    BACK PAIN    Back problem    Cancer (HCC)    PROSTATE    Congestive heart disease (HCC)    COPD (chronic obstructive pulmonary disease) (HCC)    on oxygen    Deep vein thrombosis (HCC)    Diverticulosis of large intestine    Exposure to medical diagnostic radiation    LAST DOSE OVER 4 YEARS AGO    High blood pressure    High cholesterol    History of prostate cancer    Neuropathy    LEFT INVERTED FOOT DROP,  AND TO BOTH LEGS OUTER    Osteoarthritis    Pulmonary embolism (HCC)    Pulmonary emphysema (HCC)        PSH:  Past Surgical History:   Procedure Laterality Date    Appendectomy      age 9    Colonoscopy,remankush duron,snare  03/31/2010    Performed by VANNESA ALBERT at [facility], [facility]    Fluor gid & loclzj ndl/cath spi dx/ther njx  09/15/2010    Performed by MALENA MAZA at [facility], [facility]    Fluor gid & loclzj ndl/cath spi dx/ther njx  10/20/2010    Performed by MALENA MAZA at [facility], [facility]    Head surgery proc unlisted  04/10/2009    Performed by AMY MANZO at [facility], [facility]    Injection proc for sacroiliac joint arthrography &/or anesthetic/steroid  09/15/2010    Performed by MALENA MAZA at [facility], [facility]    Injection  proc for sacroiliac joint arthrography &/or anesthetic/steroid  09/15/2010    Performed by MALENA MAZA at [facility] [place], [facility]    Injection proc for sacroiliac joint arthrography &/or anesthetic/steroid  10/20/2010    Performed by MALENA MAZA at [facility], [facility]    Injection proc for sacroiliac joint arthrography &/or anesthetic/steroid  10/20/2010    Performed by MALENA MAZA at [facility], [facility]    Other surgical history  09/21/2010    flow/us/cysto-Dr. Amin    Other surgical history  10/26/2010    UDS    Spine surgery procedure unlisted          Home Medications:  Medications Taking[1]    Scheduled Medication:   famotidine  20 mg Oral BID    Or    famotidine  20 mg Intravenous BID    magnesium oxide  400 mg Oral Daily    [START ON 3/20/2025] enoxaparin  40 mg Subcutaneous Daily    ketorolac  30 mg Intravenous Q6H    [START ON 3/20/2025] tamsulosin  0.4 mg Oral Daily @ 0700    [START ON 3/20/2025] fenofibrate micronized  134 mg Oral Daily with breakfast    fluticasone-salmeterol  1 puff Inhalation BID    [START ON 3/20/2025] umeclidinium bromide  1 puff Inhalation Daily    [START ON 3/20/2025] furosemide  40 mg Oral BID    [START ON 3/20/2025] losartan  25 mg Oral Daily    metoprolol succinate ER  25 mg Oral Daily    [START ON 3/20/2025] pantoprazole  40 mg Oral QAM AC     Continuous Infusing Medication:   sodium chloride 20 mL/hr at 03/19/25 0637    lactated ringers 1 mL/kg/hr (03/19/25 1331)     PRN Medication:  polyethylene glycol (PEG 3350)    sennosides    oxyCODONE **OR** oxyCODONE    HYDROmorphone **OR** HYDROmorphone    melatonin    ondansetron    prochlorperazine    Albuterol Sulfate    diazePAM     ALL:  Allergies[2]     Soc Hx:  Social History     Tobacco Use    Smoking status: Every Day     Types: Cigarettes    Smokeless tobacco: Never   Substance Use Topics    Alcohol use: Yes     Comment: RARELY   2X YEAR        Fam Hx  Family History   Problem Relation Age of  Onset    Cancer Father         prostate    Diabetes Father     Hypertension Father     Other (Other) Father         CVA    Diabetes Mother     Cancer Brother         prostate       Review of Systems  Comprehensive ROS reviewed and negative except for what's stated above.  Including negative for chest pain, shortness of breath, syncope.       OBJECTIVE:  /85 (BP Location: Right arm)   Pulse 91   Temp 98 °F (36.7 °C) (Oral)   Resp 16   Ht 5' 11\" (1.803 m)   Wt 218 lb (98.9 kg)   SpO2 95%   BMI 30.40 kg/m²     Exam   Gen: Alert, no acute distress  Heent: Normocephalic, atraumatic, neck supple, EOMI, PERRLA  Pulm: Lungs CTAB, normal respiratory effort  CV:  Regular rate and rhythm, no murmurs/rubs/gallops  Abd: Soft, nontender, nondistended, bowel sounds present.  Surgical sites intact.  Extremities: No peripheral edema, no clubbing, pulses intact   Skin: No rashes or lesions  Neuro: AOx3, no focal neurologic deficits, CN II-XII grossly intact  Psych: appropriate mood and affect     Diagnostics:   CBC/Chem  No results for input(s): \"WBC\", \"HGB\", \"MCV\", \"PLT\", \"BAND\", \"INR\" in the last 168 hours.    Invalid input(s): \"LYM#\", \"MONO#\", \"BASOS#\", \"EOSIN#\"    No results for input(s): \"NA\", \"K\", \"CL\", \"CO2\", \"BUN\", \"CREATSERUM\", \"GLU\", \"CA\", \"CAION\", \"MG\", \"PHOS\" in the last 168 hours.    No results for input(s): \"ALT\", \"AST\", \"ALB\", \"AMYLASE\", \"LIPASE\", \"LDH\" in the last 168 hours.    Invalid input(s): \"ALPHOS\", \"TBIL\", \"DBIL\", \"TPROT\"    Radiology:   All imaging personally reviewed      No results found.       ASSESSMENT / PLAN:    54 year old male with past medical history of prostate cancer, COPD on 2 L O2 nasal cannula chronically, hypertension, hyperlipidemia, DVT/PE, HFrEF (45-50% LVEF February 2024), spinal surgery with anterior approach c/b iliac artery injury with subsequent disability/leg weakness, who is presenting to the hospital for robotic assisted low anterior colon resection rigid  proctosigmoidoscopy.    Recurrent sigmoid diverticulitis s/p robotic assisted low anterior colon resection rigid proctosigmoidoscopy 3/19)  - PO/IV meds for pain control per surgery, wean to oral meds as able  - Adv diet as tolerated per surgery, zofran prn for nausea, OBR  - PT/OT/SW  - monitor for acute blood loss anemia, cbc in am  - further management per surgery    COPD  -On baseline 2 L O2 nasal cannula  -Continue home Advair, Incruse Ellipta inhalers    HFrEF (45-50% LVEF February 2024)  -Continue home metoprolol succinate, can resume Lasix and losartan tomorrow    GERD  -Continue pantoprazole, Pepcid    Hypertension  -Continue beta-blocker, resume losartan tomorrow    History of DVT/PE  -No longer on anticoagulation    Dispo: Inpatient    Outpatient or previous hospital records reviewed. Questions/concerns were discussed with patient and/or family by bedside. Discussed with surgery.     DO Blair Pugh [facility]  Hospitalist  Contact via [employer]              Advanced Care Planning    While discussing goals of care with the patient and their family, patient voluntarily participated in an advanced care planning discussion. The following was discussed: Patient is a full code at this time.  He is okay with all resuscitative measures including CPR, ACLS, intubation, and all escalation of care.  Patient notes that he has a 6-month-old daughter at home and recently got , feels like he is in a good spot in life and would like to get back to normal after everything that he has been through recently.    16 minutes were spent in discussing advanced care planning. This time was exclusive of the documented time for this visit.      Supplementary Documentation:   DVT Mechanical Prophylaxis:   SCDs, Early ambuation  DVT Pharmacologic Prophylaxis   Medication    [START ON 3/20/2025] enoxaparin (Lovenox) 40 MG/0.4ML SUBQ injection 40 mg                Code Status: Not on file  Jose: Jose  catheter in place  Garcia Duration (in days): 1  Central line:    BRADY:                        [1]   Outpatient Medications Marked as Taking for the 3/19/25 encounter (Hospital Encounter)   Medication Sig Dispense Refill    furosemide 40 MG Oral Tab Take 1 tablet (40 mg total) by mouth 2 (two) times daily.      potassium chloride 20 MEQ Oral Tab CR Take 1 tablet (20 mEq total) by mouth 2 (two) times daily.      alfuzosin ER 10 MG Oral Tablet 24 Hr Take 1 tablet (10 mg total) by mouth daily.      docusate sodium 100 MG Oral Cap Take 1 capsule (100 mg total) by mouth daily.      fenofibrate micronized 134 MG Oral Cap Take 1 capsule (134 mg total) by mouth daily with breakfast.      losartan 25 MG Oral Tab Take 1 tablet (25 mg total) by mouth daily.      metoprolol succinate ER 25 MG Oral Tablet 24 Hr Take 1 tablet (25 mg total) by mouth daily.      Omeprazole 40 MG Oral Capsule Delayed Release Take 1 capsule (40 mg total) by mouth daily.      diazePAM 5 MG Oral Tab Take 1 tablet (5 mg total) by mouth every 6 (six) hours as needed for Anxiety.      MAGNESIUM OR Take 1 tablet by mouth in the morning and 1 tablet before bedtime. 230 mg .      fluticasone-umeclidin-vilant 100-62.5-25 MCG/ACT Inhalation Aerosol Powder, Breath Activated Inhale 1 puff into the lungs daily.      Albuterol Sulfate 108 (90 Base) MCG/ACT Inhalation Aerosol Powder, Breath Activated Inhale 2 puffs into the lungs every 6 (six) hours as needed.      NORCO  MG OR TABS Take 1 tablet by mouth every 4 (four) hours as needed for Pain.     [2]   Allergies  Allergen Reactions    Aspirin SWELLING    Lyrica [Pregabalin] SWELLING

## 2025-03-20 NOTE — PLAN OF CARE
Pt here from: Home with wife and son   Neuro: A&Ox4, VSS, 2L baseline, , IS. Denies cough, chest pain, and SOB.   GI: Abdomen soft, passing gas and belching. Denies nausea. Last BM was today, loose.   : Voids freely in urinal.   Pain controlled with meds per MAR.   Up ad tere after set up. Pt uses crutches from home.  Incisions: Laps x 4 with glue are CDI and DAVID. RLQ transverse with aquacel is CDI.   Diet: Tolerating soft diet.   IVF running per order through PIV L FA.    All appropriate safety measures in place. All questions and concerns addressed. Bed locked and in lowest position. Call light in reach. PT will DC tomorrow.

## 2025-03-20 NOTE — PROGRESS NOTES
[facility]  Progress Note    Dorian Gonzalez Patient Status:  Inpatient    1970 MRN [id number]   Location [facility] 3NW-A Attending Simon Horne MD   Hosp Day # 1 PCP Simón Carrington MD     Subjective:    Patient reports he was feeling well this morning though he does feel bloating and some gas pains   He did tolerating diet, no nausea, passing flatus     Objective/Physical Exam:    Vital Signs:  Blood pressure 125/74, pulse 74, temperature 97.5 °F (36.4 °C), temperature source Oral, resp. rate 17, height 5' 11\" (1.803 m), weight 218 lb (98.9 kg), SpO2 97%.    General:  Alert, orientated x3.  Cooperative.  No apparent distress.  Abdomen:  aquacel intact, incisions intact, soft, mild tenderness as expected, distended    Labs:  Reviewed    Lab Results   Component Value Date    WBC 12.5 2025    HGB 12.0 2025    HCT 33.7 2025    .0 2025    CREATSERUM 1.17 2025    BUN 12 2025     2025    K 3.4 2025     2025    CO2 27.0 2025     2025    CA 8.7 2025    MG 1.6 2025    PHOS 1.9 2025    PGLU 115 2025       Problem List:  Patient Active Problem List   Diagnosis    Other and unspecified disc disorder of unspecified region    Degeneration of lumbar or lumbosacral intervertebral disc    Lumbar radicular pain       Impression:     53 y/o S/P robotic Low anterior colon resection  -tolerating diet, though distended  -gas pain  -afebrile    Plan:    Diet per ERAS protocol  Encourage ambulation/IS  Dc likely tomorrow  All questions answered    BABS Cote  [facility] Surgery  3/20/2025

## 2025-03-20 NOTE — PHYSICAL THERAPY NOTE
PT order received via post surgical order set, chart reviewed.  Met with pt this AM.  Pt reports has already been ambulating in hallway.  Pt typically uses loftstrand crutches, however is unable to at this time due to IV site.  Pt reports able to use RW without issues.  Pt denies therapy needs at this time.  D/w RN.  Please reconsult if pt experiences a decline in mobility.

## 2025-03-20 NOTE — PLAN OF CARE
Alert x 4. VSS on RA. No complaints of pain. Tolerating diet. Ambulates with assist. Garcia in place with clear yellow urine. Abdominal incisions intact. POC discussed. All current needs met. Call light in reach

## 2025-03-20 NOTE — OCCUPATIONAL THERAPY NOTE
OT order received via post surgical order set, chart reviewed. PT met with pt this AM. Pt reports has already been ambulating in hallway. Pt typically uses loftstrand crutches, however is unable to at this time due to IV site. Pt reports able to use RW without issues and no concerns with ADLs. Pt denies therapy needs at this time. D/w RN. Please reconsult if pt experiences a decline in mobility or ADLs.

## 2025-03-21 VITALS
HEART RATE: 73 BPM | TEMPERATURE: 98 F | BODY MASS INDEX: 30.52 KG/M2 | RESPIRATION RATE: 18 BRPM | HEIGHT: 71 IN | DIASTOLIC BLOOD PRESSURE: 85 MMHG | WEIGHT: 218 LBS | SYSTOLIC BLOOD PRESSURE: 136 MMHG | OXYGEN SATURATION: 95 %

## 2025-03-21 LAB
ANION GAP SERPL CALC-SCNC: 7 MMOL/L (ref 0–18)
BASOPHILS # BLD AUTO: 0.05 X10(3) UL (ref 0–0.2)
BASOPHILS NFR BLD AUTO: 0.5 %
BUN BLD-MCNC: 12 MG/DL (ref 9–23)
CALCIUM BLD-MCNC: 9.5 MG/DL (ref 8.7–10.6)
CHLORIDE SERPL-SCNC: 106 MMOL/L (ref 98–112)
CO2 SERPL-SCNC: 29 MMOL/L (ref 21–32)
CREAT BLD-MCNC: 1.09 MG/DL
EGFRCR SERPLBLD CKD-EPI 2021: 80 ML/MIN/1.73M2 (ref 60–?)
EOSINOPHIL # BLD AUTO: 0.15 X10(3) UL (ref 0–0.7)
EOSINOPHIL NFR BLD AUTO: 1.5 %
ERYTHROCYTE [DISTWIDTH] IN BLOOD BY AUTOMATED COUNT: 13.2 %
GLUCOSE BLD-MCNC: 92 MG/DL (ref 70–99)
HCT VFR BLD AUTO: 35.4 %
HGB BLD-MCNC: 12.4 G/DL
IMM GRANULOCYTES # BLD AUTO: 0.04 X10(3) UL (ref 0–1)
IMM GRANULOCYTES NFR BLD: 0.4 %
LYMPHOCYTES # BLD AUTO: 3.04 X10(3) UL (ref 1–4)
LYMPHOCYTES NFR BLD AUTO: 29.8 %
MAGNESIUM SERPL-MCNC: 1.8 MG/DL (ref 1.6–2.6)
MCH RBC QN AUTO: 29.6 PG (ref 26–34)
MCHC RBC AUTO-ENTMCNC: 35 G/DL (ref 31–37)
MCV RBC AUTO: 84.5 FL
MONOCYTES # BLD AUTO: 0.75 X10(3) UL (ref 0.1–1)
MONOCYTES NFR BLD AUTO: 7.4 %
NEUTROPHILS # BLD AUTO: 6.17 X10 (3) UL (ref 1.5–7.7)
NEUTROPHILS # BLD AUTO: 6.17 X10(3) UL (ref 1.5–7.7)
NEUTROPHILS NFR BLD AUTO: 60.4 %
OSMOLALITY SERPL CALC.SUM OF ELEC: 293 MOSM/KG (ref 275–295)
PHOSPHATE SERPL-MCNC: 2.1 MG/DL (ref 2.4–5.1)
PLATELET # BLD AUTO: 303 10(3)UL (ref 150–450)
POTASSIUM SERPL-SCNC: 4.1 MMOL/L (ref 3.5–5.1)
RBC # BLD AUTO: 4.19 X10(6)UL
SODIUM SERPL-SCNC: 142 MMOL/L (ref 136–145)
WBC # BLD AUTO: 10.2 X10(3) UL (ref 4–11)

## 2025-03-21 PROCEDURE — 84100 ASSAY OF PHOSPHORUS: CPT | Performed by: STUDENT IN AN ORGANIZED HEALTH CARE EDUCATION/TRAINING PROGRAM

## 2025-03-21 PROCEDURE — 85025 COMPLETE CBC W/AUTO DIFF WBC: CPT | Performed by: STUDENT IN AN ORGANIZED HEALTH CARE EDUCATION/TRAINING PROGRAM

## 2025-03-21 PROCEDURE — 83735 ASSAY OF MAGNESIUM: CPT | Performed by: STUDENT IN AN ORGANIZED HEALTH CARE EDUCATION/TRAINING PROGRAM

## 2025-03-21 PROCEDURE — 80048 BASIC METABOLIC PNL TOTAL CA: CPT | Performed by: STUDENT IN AN ORGANIZED HEALTH CARE EDUCATION/TRAINING PROGRAM

## 2025-03-21 RX ORDER — MAGNESIUM OXIDE 400 MG/1
400 TABLET ORAL ONCE
Status: DISCONTINUED | OUTPATIENT
Start: 2025-03-21 | End: 2025-03-21

## 2025-03-21 NOTE — PROGRESS NOTES
[facility]  Progress Note    Dorian Gonzalez Patient Status:  Inpatient    1970 MRN [id number]   Location [facility] 3NW-A Attending Simon Horne MD   Hosp Day # 2 PCP Simón Carrington MD     Subjective:    Patient is feeling better today, distension improved  Tolerating diet  Pain improved     Objective/Physical Exam:    Vital Signs:  Blood pressure 136/85, pulse 73, temperature 97.7 °F (36.5 °C), temperature source Oral, resp. rate 18, height 5' 11\" (1.803 m), weight 218 lb (98.9 kg), SpO2 95%.    General:  Alert, orientated x3.  Cooperative.  No apparent distress.  Abdomen:  aquacel removed, incisions intact, soft, less distension mild tenderness as expected     Labs:  Reviewed    Lab Results   Component Value Date    WBC 10.2 2025    HGB 12.4 2025    HCT 35.4 2025    .0 2025    CREATSERUM 1.09 2025    BUN 12 2025     2025    K 4.1 2025     2025    CO2 29.0 2025    GLU 92 2025    CA 9.5 2025    MG 1.8 2025    PHOS 2.1 2025       Problem List:  Patient Active Problem List   Diagnosis    Other and unspecified disc disorder of unspecified region    Degeneration of lumbar or lumbosacral intervertebral disc    Lumbar radicular pain       Impression:     55 y/o POD # 2 robotic Low anterior colon resection   -tolerating diet, pain improved, distension improved     Plan:    Reviewed low fiber diet for home  Reviewed postop restrictions  Ok for dc home today  F/u in office in 1 week  Patient has postop pain medication at home   All questions asnwered    BABS Cote  [facility] Surgery  3/21/2025

## 2025-03-21 NOTE — PLAN OF CARE
Pt here from: Home with wife   Neuro: A&Ox4, VSS, 2L, , IS. Denies cough, chest pain, and SOB.   GI: Abdomen soft, passing gas and belching. Denies nausea. Last BM was this morning.   : Voids   Pain controlled with meds per MAR.    Up ad tere with crutches from home.   Incisions: All abd incisions are CDI and DAVID.   Diet: Tolerating soft/ERAS diet.   IV x 2 saline locked.   All appropriate safety measures in place. All questions and concerns addressed. Bed locked and in lowest position. Call light in reach.     Patient is alert and oriented x4. ABD incisions are CDI. Denies nausea or vomiting. VSS, tolerating diet, voiding adequately, pain controlled, tolerating ambulating. Discharge education provided. Reviewed medications and follow up appts. All questions answered and concerns addressed, pt verbalized understanding. Pt DC in stable condition. PIV removed and intact. Pt assessed and ready for discharge.   Pt waiting for wife to arrive to drive him home.

## 2025-03-21 NOTE — DISCHARGE INSTRUCTIONS
Home Care Instructions  LAPAROSCOPIC/ROBOTIC SURGERY      DIET   Your diet should be as you tolerate. Eat light foods the first 24 hours.   Do not drink alcohol while taking the pain medication    ACTIVITY   You should not drive for the first 3 to 5 days or if you are still requiring prescription pain medication.   No lifting greater than 10 to 15 pounds for 3 weeks   Avoid strenuous activity such as running and physical workouts for 3 weeks. Normal daily activities are fine, such as climbing stairs   You may shower after 24 hours. The incision can get wet but should not be under water in a bath.   You may return to work as instructed by your surgeon.    CARE OF SURGICAL SITE   Pain, colorful bruising and slight swelling at the incision sites is usually normal   If you experience fever, chills, inability to urinate, nausea with vomiting, severe diarrhea  or severe, cramping abdominal pain please contact your surgeon    APPOINTMENT   Call the office when you arrive home after surgery and make an appointment to see your surgeon in one week.   Should you develop any problems prior to your scheduled appointment, do not hesitate  to call the office.    [facility]  [phone]    Please call to set up your 1 week postop office visit    Address:  [address] [place] [address]

## 2025-03-21 NOTE — PROGRESS NOTES
[facility] Hospitalist Progress Note     CC: Hospital Follow up    PCP: Simón Carrington MD       Assessment/Plan:     Active Problems:    * No active hospital problems. *          54 year old male with past medical history of prostate cancer, COPD on 2 L O2 nasal cannula chronically, hypertension, hyperlipidemia, DVT/PE, HFrEF (45-50% LVEF February 2024), spinal surgery with anterior approach c/b iliac artery injury with subsequent disability/leg weakness, who is presenting to the hospital for robotic assisted low anterior colon resection rigid proctosigmoidoscopy.     Recurrent sigmoid diverticulitis s/p robotic assisted low anterior colon resection rigid proctosigmoidoscopy 3/19)  - PO/IV meds for pain control per surgery, wean to oral meds as able  - Adv diet as tolerated per surgery, zofran prn for nausea, OBR  - PT/OT/SW  - cbc reviwed, stable, appropriate blood loss  - further management per surgery     COPD  -On baseline 2 L O2 nasal cannula  -Continue home Advair, Incruse Ellipta inhalers     HFrEF (45-50% LVEF February 2024)  -Continue home metoprolol succinate, resume losartan  - Hold Lasix while patient on IVF, recommend reducing IVF given HFrEF    GERD  -Continue pantoprazole, Pepcid     Hypertension  -Continue beta-blocker, resume losartan tomorrow     History of DVT/PE  -No longer on anticoagulation  -Recommend low-dose Eliquis.  Discussed with general surgery, okay to resume Eliquis 2.5 mg twice daily for 2 weeks at discharge.  Patient already has supply at home.  Anticipatory guidance provided.     Dispo: Okay to discharge from hospitalist standpoint     Outpatient or previous hospital records reviewed. Questions/concerns were discussed with patient and/or family by bedside. Discussed with surgery.     Frandy Roman DO  Hospitalist  [facility]       Subjective:     No overnight events.  Wants to go home.  Patient had brought forward the concern of DVTs in the past.  He states he has enough supply  for Eliquis at home for now.  Anticipatory guidance provided.  He shows understanding.    OBJECTIVE:    Blood pressure 136/85, pulse 73, temperature 97.7 °F (36.5 °C), temperature source Oral, resp. rate 18, height 5' 11\" (1.803 m), weight 218 lb (98.9 kg), SpO2 95%.    Temp:  [97.7 °F (36.5 °C)-98.1 °F (36.7 °C)] 97.7 °F (36.5 °C)  Pulse:  [72-73] 73  Resp:  [18] 18  BP: (125-136)/(81-85) 136/85  SpO2:  [95 %-98 %] 95 %      Intake/Output:    Intake/Output Summary (Last 24 hours) at 3/21/2025 1426  Last data filed at 3/21/2025 0822  Gross per 24 hour   Intake 1438 ml   Output 1875 ml   Net -437 ml       Last 3 Weights   03/19/25 0636 218 lb (98.9 kg)   03/07/25 1102 220 lb (99.8 kg)   09/22/21 1318 183 lb (83 kg)   02/01/11 0849 214 lb (97.1 kg)       Exam   Gen: No acute distress, alert and oriented x3, no focal neurologic deficits  Heent: NC AT, mucous memb clear, neck supple  Pulm: Lungs clear, normal respiratory effort  CV: Heart with regular rate and rhythm, no peripheral edema  Abd: Abdomen soft, tenderness around incision sites, incisions appear clean dry and intact, nondistended, hypoactive bowel sounds  MSK: Full range of motion in extremities, no clubbing, no cyanosis  Skin: no rashes or lesions  Neuro: AO*3, motor intact, no sensory deficits  Psyc: appropriate mood and affect      Data Review:       Labs:     Recent Labs   Lab 03/20/25  0511 03/21/25  0444   RBC 4.04* 4.19*   HGB 12.0* 12.4*   HCT 33.7* 35.4*   MCV 83.4 84.5   MCH 29.7 29.6   MCHC 35.6 35.0   RDW 13.1 13.2   NEPRELIM 8.81* 6.17   WBC 12.5* 10.2   .0 303.0         Recent Labs   Lab 03/20/25  0511 03/20/25  1705 03/21/25  0444   *  --  92   BUN 12  --  12   CREATSERUM 1.17  --  1.09   EGFRCR 73  --  80   CA 8.7  --  9.5     --  142   K 3.4* 4.0 4.1     --  106   CO2 27.0  --  29.0       No results for input(s): \"ALT\", \"AST\", \"ALB\", \"AMYLASE\", \"LIPASE\", \"LDH\" in the last 168 hours.    Invalid input(s): \"ALPHOS\",  \"TBIL\", \"DBIL\", \"TPROT\"      Imaging:  No results found.      Meds:      magnesium oxide  400 mg Oral Once    famotidine  20 mg Oral BID    Or    famotidine  20 mg Intravenous BID    magnesium oxide  400 mg Oral Daily    enoxaparin  40 mg Subcutaneous Daily    ketorolac  30 mg Intravenous Q6H    tamsulosin  0.4 mg Oral Daily @ 0700    fenofibrate micronized  134 mg Oral Daily with breakfast    fluticasone-salmeterol  1 puff Inhalation BID    umeclidinium bromide  1 puff Inhalation Daily    [Held by provider] furosemide  40 mg Oral BID    losartan  25 mg Oral Daily    metoprolol succinate ER  25 mg Oral Daily    pantoprazole  40 mg Oral QAM AC           polyethylene glycol (PEG 3350)    sennosides    oxyCODONE **OR** oxyCODONE    HYDROmorphone **OR** HYDROmorphone    melatonin    ondansetron    prochlorperazine    albuterol    diazePAM      Supplementary Documentation:   DVT Mechanical Prophylaxis:   SCDs, Early ambuation  DVT Pharmacologic Prophylaxis   Medication    enoxaparin (Lovenox) 40 MG/0.4ML SUBQ injection 40 mg                Code Status: Full Code  Garcia: No urinary catheter in place  Garcia Duration (in days):   Central line:    BRADY: 3/21/2025

## 2025-03-21 NOTE — PROGRESS NOTES
Patient is alert and oriented x 4.   Vital signs stable. Afebrile. On room air. Encouraged use of IS.   On Low Fiber/Soft diet, tolerating well. ERAS protocol.  Abdomen soft, passing gas and belching.  Patient voids in urinal - yellow/clear/adequate amount of urine.  Abdominal lap sites x 4, closed with skin glue, open to air ; RLQ transverse incision x 1 with Aquacel dressing - Clean/Dry/Intact.  Patient denies pain at this time.   Ambulating independently using crutches from home.  Medications given per MAR.  Plan of care discussed with patient; no further questions at this time. Plan to discharge home tomorrow.   All appropriate safety measures in place. Call light in reach.

## 2025-03-28 NOTE — PAYOR COMM NOTE
--------------  DISCHARGE REVIEW    Payor: [employer]  Subscriber #:  [id number]  Authorization Number: [id number]    Admit date: 3/19/25  Admit time:   5:29 AM  Discharge Date: 3/21/2025  1:11 PM     Admitting Physician: Simon Horne MD  Attending Physician:  Amberly att. providers found  Primary Care Physician: Simón Carrington MD ohn Allen Lestina Location: [facility] Perioperative   CSN [id number] [facility] [id number]   Admission Date 3/19/2025 Procedure Date 3/19/2025   Attending Physician Simon Horne MD Procedure Physician Simon Horne MD      Pre-Operative Diagnosis: ACUTE DIVERTICULITIS     Post-Operative Diagnosis: ACUTE DIVERTICULITIS     Procedure Performed: XI ROBOT-ASSISTED LOW ANTERIOR COLON RESECTION RIGID PROCTOSIGMOIDOSCOPY

## 2025-04-02 NOTE — PROGRESS NOTES
Provider Clarification    Additional information on the patient's respiratory status in this patient with COPD and on home oxygen      Chronic hypoxic respiratory failure     This note is part of the patient's medical record.

## (undated) DEVICE — AIRSEAL TRI-LUMEN LILTERED TUBE SET: Brand: AIRSEAL

## (undated) DEVICE — ANTIBACTERIAL UNDYED BRAIDED (POLYGLACTIN 910), SYNTHETIC ABSORBABLE SUTURE: Brand: COATED VICRYL

## (undated) DEVICE — STANDARD HYPODERMIC NEEDLE,POLYPROPYLENE HUB: Brand: MONOJECT

## (undated) DEVICE — SKN PREP SPNG STKS PVP PNT STR: Brand: MEDLINE INDUSTRIES, INC.

## (undated) DEVICE — STAPLER 60: Brand: SUREFORM

## (undated) DEVICE — SUT PERMA- 3-0 30IN SH NABSRB BLK 26MM 1/2

## (undated) DEVICE — SUT COAT VCRL + 0 54IN ABSRB UD ANTIBACT

## (undated) DEVICE — 40580 - THE PINK PAD - ADVANCED TRENDELENBURG POSITIONING KIT: Brand: 40580 - THE PINK PAD - ADVANCED TRENDELENBURG POSITIONING KIT

## (undated) DEVICE — TRAY CATH 16FR F INCL BARDX IC COMPLT CARE

## (undated) DEVICE — DRESSING SUR 3.5X6IN WTRPRF BACT BARR

## (undated) DEVICE — STAPLER 60 RELOAD BLUE: Brand: SUREFORM

## (undated) DEVICE — SYRINGE MED 20ML STD CLR PLAS LL TIP N CTRL

## (undated) DEVICE — MONOPOLAR CURVED SCISSORS: Brand: ENDOWRIST

## (undated) DEVICE — AIRSEAL 5 MM ACCESS PORT AND LOW PROFILE OBTURATOR WITH BLADELESS OPTICAL TIP, 120 MM LENGTH: Brand: AIRSEAL

## (undated) DEVICE — CIRCULAR MECH XL SEAL 29MM

## (undated) DEVICE — LAPCLINCH GRASPER TIP, DISPOSABLE: Brand: RENEW

## (undated) DEVICE — SEAL

## (undated) DEVICE — GOWN,SIRUS,FABRNF,XL,20/CS: Brand: MEDLINE

## (undated) DEVICE — ENDOPATH ULTRA VERESS INSUFFLATION NEEDLES WITH LUER LOCK CONNECTORS: Brand: ENDOPATH

## (undated) DEVICE — ADHESIVE SKIN TOP FOR WND CLSR DERMBND ADV

## (undated) DEVICE — SIGMOIDOSCOPE LIGHTED BIOSEAL

## (undated) DEVICE — CURVED JAW CORDLESS ULTRASONIC DISSECTOR: Brand: SONICISION 7

## (undated) DEVICE — FENESTRATED BIPOLAR FORCEPS: Brand: ENDOWRIST

## (undated) DEVICE — SUT MCRYL 4-0 18IN PS-2 ABSRB UD 19MM 3/8 CIR

## (undated) DEVICE — MEGA SUTURECUT ND: Brand: ENDOWRIST

## (undated) DEVICE — COVER,LIGHT,CAMERA,HARD,1/PK,STRL: Brand: MEDLINE

## (undated) DEVICE — VESSEL SEALER EXTEND: Brand: ENDOWRIST

## (undated) DEVICE — TIP-UP FENESTRATED GRASPER: Brand: ENDOWRIST

## (undated) DEVICE — COLUMN DRAPE

## (undated) DEVICE — LAPAROSCOPIC ACCESS SYSTEM: Brand: ALEXIS LAPAROSCOPIC SYSTEM

## (undated) DEVICE — SLEEVE COMPR MD KNEE LEN SGL USE KENDALL SCD

## (undated) DEVICE — ARM DRAPE

## (undated) DEVICE — DRAPE,TOP,102X53,STERILE: Brand: MEDLINE

## (undated) DEVICE — GLOVE SUR 8 SENSICARE PI PIP CRM PWD F

## (undated) DEVICE — LAPAROVUE VISIBILITY SYSTEM LAPAROSCOPIC SOLUTIONS: Brand: LAPAROVUE

## (undated) DEVICE — BLADELESS OBTURATOR: Brand: WECK VISTA

## (undated) DEVICE — SUT PDS II 0 36IN CT-1 ABSRB VLT L36MM 1/2

## (undated) DEVICE — TIP COVER ACCESSORY

## (undated) DEVICE — PACK CDS LAP COLON

## (undated) DEVICE — VIOLET BRAIDED (POLYGLACTIN 910), SYNTHETIC ABSORBABLE SUTURE: Brand: COATED VICRYL

## (undated) DEVICE — ABSORBABLE WOUND CLOSURE DEVICE: Brand: V-LOC 180

## (undated) DEVICE — STAPLER 60 RELOAD GREEN: Brand: SUREFORM

## (undated) DEVICE — TROCAR: Brand: KII FIOS FIRST ENTRY

## (undated) NOTE — LETTER
OUTSIDE TESTING RESULT REQUEST     IMPORTANT: FOR YOUR IMMEDIATE ATTENTION  Please FAX all test results listed below to: [phone]     Testing already done on or about: 3-13-25     * * * * If testing is NOT complete, arrange with patient A.S.A.P. * * * *      Patient Name: Dorian Gonzalez  Surgery Date: 3/19/2025  Medical Record: [id number]  CSN: [id number]  : 1970 - A: 54 y     Sex: male  Surgeon(s):  Simon Horne MD  Procedure: XI ROBOT-ASSISTED LOW ANTERIOR COLON RESECTION POSSIBLE OPEN, RIGID PROCTOSIGMOIDOSCOPY  Anesthesia Type: General     Surgeon: Simon Horne MD     The following Testing and Time Line are REQUIRED PER ANESTHESIA     EKG READ AND SIGNED WITHIN   90 days  CBC, Platelet; NO Differential within  30 days  BMP (requires 4 hour fast) within  90 days  Electrolytes within 21 days      Thank You,   Sent by:PRE ADMISSION TESTING AT [facility]

## (undated) NOTE — LETTER
OUTSIDE TESTING RESULT REQUEST     IMPORTANT: FOR YOUR IMMEDIATE ATTENTION  Please FAX all test results listed below to: [phone]     Testing already done on or about: 2025    * * * * If testing is NOT complete, arrange with patient A.S.A.P. * * * *      Patient Name: Dorian Gonzalez  Surgery Date: 3/19/2025  Medical Record: [id number]  CSN: [id number]  : 1970 - A: 54 y     Sex: male  Surgeon(s):  Simon Horne MD  Procedure: XI ROBOT-ASSISTED LOW ANTERIOR COLON RESECTION POSSIBLE OPEN, RIGID PROCTOSIGMOIDOSCOPY  Anesthesia Type: General     Surgeon: Simon Horne MD     The following Testing and Time Line are REQUIRED PER ANESTHESIA     EKG READ AND SIGNED WITHIN   90 days      Thank You,   Sent by: NONI